# Patient Record
Sex: FEMALE | URBAN - METROPOLITAN AREA
[De-identification: names, ages, dates, MRNs, and addresses within clinical notes are randomized per-mention and may not be internally consistent; named-entity substitution may affect disease eponyms.]

---

## 2022-04-05 ENCOUNTER — APPOINTMENT (RX ONLY)
Dept: URBAN - METROPOLITAN AREA CLINIC 10 | Facility: CLINIC | Age: 52
Setting detail: DERMATOLOGY
End: 2022-04-05

## 2022-04-05 DIAGNOSIS — L259 CONTACT DERMATITIS AND OTHER ECZEMA, UNSPECIFIED CAUSE: ICD-10-CM | Status: INADEQUATELY CONTROLLED

## 2022-04-05 PROBLEM — L23.9 ALLERGIC CONTACT DERMATITIS, UNSPECIFIED CAUSE: Status: ACTIVE | Noted: 2022-04-05

## 2022-04-05 PROCEDURE — ? DIAGNOSIS COMMENT

## 2022-04-05 PROCEDURE — ? PRESCRIPTION MEDICATION MANAGEMENT

## 2022-04-05 PROCEDURE — 99204 OFFICE O/P NEW MOD 45 MIN: CPT

## 2022-04-05 PROCEDURE — ? COUNSELING

## 2022-04-05 PROCEDURE — ? PRESCRIPTION

## 2022-04-05 RX ORDER — TACROLIMUS 1 MG/G
OINTMENT TOPICAL
Qty: 60 | Refills: 1 | Status: ERX | COMMUNITY
Start: 2022-04-05

## 2022-04-05 RX ADMIN — TACROLIMUS: 1 OINTMENT TOPICAL at 00:00

## 2022-04-05 ASSESSMENT — BSA RASH: BSA RASH: 2

## 2022-04-05 ASSESSMENT — SEVERITY ASSESSMENT: SEVERITY: MILD TO MODERATE

## 2022-04-05 ASSESSMENT — ITCH NUMERIC RATING SCALE: HOW SEVERE IS YOUR ITCHING?: 2

## 2022-04-05 NOTE — PROCEDURE: PRESCRIPTION MEDICATION MANAGEMENT
Initiate Treatment: tacrolimus 0.1 % topical ointment \\nQuantity: 60.0 g  Days Supply: 30\\nSig: Apply to affected areas of face, neck and eyelids twice a day for 3 weeks. May cause burning or irritation for the first few applications. Repeat as needed for flares.
Plan: - continue vanicream products for skin care.\\n- patch testing in the next 3 weeks.
Render In Strict Bullet Format?: No
Detail Level: Zone

## 2022-04-05 NOTE — PROCEDURE: DIAGNOSIS COMMENT
Comment: Likely due to personal care products. May perform patch testing in the future.
Detail Level: Simple
Render Risk Assessment In Note?: no

## 2022-04-05 NOTE — HPI: RASH
What Type Of Note Output Would You Prefer (Optional)?: Standard Output
Is The Patient Presenting As Previously Scheduled?: Yes
How Severe Is Your Rash?: moderate
Is This A New Presentation, Or A Follow-Up?: Rash
Additional History: Pt has used oral steroids. First treatment was for 5 days. Second treatment was for 12 days.  Third treatment was for 5 days. Last dose finished Saturday. Rash is starting to come back now Rash seems to come back worse each time after finishing oral steroid.

## 2022-04-25 ENCOUNTER — APPOINTMENT (RX ONLY)
Dept: URBAN - METROPOLITAN AREA CLINIC 10 | Facility: CLINIC | Age: 52
Setting detail: DERMATOLOGY
End: 2022-04-25

## 2022-04-25 DIAGNOSIS — L259 CONTACT DERMATITIS AND OTHER ECZEMA, UNSPECIFIED CAUSE: ICD-10-CM

## 2022-04-25 PROBLEM — L23.9 ALLERGIC CONTACT DERMATITIS, UNSPECIFIED CAUSE: Status: ACTIVE | Noted: 2022-04-25

## 2022-04-25 PROCEDURE — 95044 PATCH/APPLICATION TESTS: CPT

## 2022-04-25 PROCEDURE — ? PATCH TESTING

## 2022-04-25 PROCEDURE — ? PHOTO-DOCUMENTATION

## 2022-04-25 ASSESSMENT — LOCATION SIMPLE DESCRIPTION DERM: LOCATION SIMPLE: LEFT UPPER BACK

## 2022-04-25 ASSESSMENT — LOCATION ZONE DERM: LOCATION ZONE: TRUNK

## 2022-04-25 ASSESSMENT — LOCATION DETAILED DESCRIPTION DERM: LOCATION DETAILED: LEFT MEDIAL UPPER BACK

## 2022-04-27 ENCOUNTER — APPOINTMENT (RX ONLY)
Dept: URBAN - METROPOLITAN AREA CLINIC 10 | Facility: CLINIC | Age: 52
Setting detail: DERMATOLOGY
End: 2022-04-27

## 2022-04-27 DIAGNOSIS — L259 CONTACT DERMATITIS AND OTHER ECZEMA, UNSPECIFIED CAUSE: ICD-10-CM

## 2022-04-27 PROBLEM — L23.9 ALLERGIC CONTACT DERMATITIS, UNSPECIFIED CAUSE: Status: ACTIVE | Noted: 2022-04-27

## 2022-04-27 PROCEDURE — 99212 OFFICE O/P EST SF 10 MIN: CPT

## 2022-04-27 PROCEDURE — ? TRUE TEST READING

## 2022-04-27 PROCEDURE — ? PHOTO-DOCUMENTATION

## 2022-04-27 NOTE — PROCEDURE: TRUE TEST READING
Diazolidinylurea: no reaction
What Reading Time Point?: 48 hour
Detail Level: Zone
Show Allergen Counseling In The Note?: No
Quaternium-15: +/-
Show Negative Results In The Note?: Yes
Number Of Patches Read: 36

## 2022-04-29 ENCOUNTER — APPOINTMENT (RX ONLY)
Dept: URBAN - METROPOLITAN AREA CLINIC 42 | Facility: CLINIC | Age: 52
Setting detail: DERMATOLOGY
End: 2022-04-29

## 2022-04-29 DIAGNOSIS — L259 CONTACT DERMATITIS AND OTHER ECZEMA, UNSPECIFIED CAUSE: ICD-10-CM

## 2022-04-29 PROBLEM — L23.9 ALLERGIC CONTACT DERMATITIS, UNSPECIFIED CAUSE: Status: ACTIVE | Noted: 2022-04-29

## 2022-04-29 PROCEDURE — ? DIAGNOSIS COMMENT

## 2022-04-29 PROCEDURE — ? PHOTO-DOCUMENTATION

## 2022-04-29 PROCEDURE — ? EDUCATIONAL RESOURCES PROVIDED

## 2022-04-29 PROCEDURE — 99212 OFFICE O/P EST SF 10 MIN: CPT

## 2022-04-29 PROCEDURE — ? TRUE TEST READING

## 2022-04-29 NOTE — PROCEDURE: TRUE TEST READING
Diazolidinylurea: no reaction
What Reading Time Point?: 72 hour
Detail Level: Zone
Show Allergen Counseling In The Note?: No
Quaternium-15: 1+
Show Negative Results In The Note?: Yes
Number Of Patches Read: 36

## 2022-04-29 NOTE — PROCEDURE: DIAGNOSIS COMMENT
Render Risk Assessment In Note?: no
Comment: 2nd reading today, still showing +reaction to quaternium-15. TRUE test allergen video to patient.
Detail Level: Simple